# Patient Record
Sex: MALE | Race: WHITE | NOT HISPANIC OR LATINO | URBAN - METROPOLITAN AREA
[De-identification: names, ages, dates, MRNs, and addresses within clinical notes are randomized per-mention and may not be internally consistent; named-entity substitution may affect disease eponyms.]

---

## 2017-01-04 ENCOUNTER — OUTPATIENT (OUTPATIENT)
Dept: OUTPATIENT SERVICES | Facility: HOSPITAL | Age: 77
LOS: 1 days | End: 2017-01-04

## 2017-06-06 ENCOUNTER — OUTPATIENT (OUTPATIENT)
Dept: OUTPATIENT SERVICES | Facility: HOSPITAL | Age: 77
LOS: 1 days | Discharge: HOME | End: 2017-06-06

## 2019-07-05 PROBLEM — Z00.00 ENCOUNTER FOR PREVENTIVE HEALTH EXAMINATION: Status: ACTIVE | Noted: 2019-07-05

## 2019-07-10 ENCOUNTER — APPOINTMENT (OUTPATIENT)
Dept: MRI IMAGING | Facility: CLINIC | Age: 79
End: 2019-07-10
Payer: MEDICARE

## 2019-07-10 PROCEDURE — 72195 MRI PELVIS W/O DYE: CPT

## 2019-07-10 PROCEDURE — 72148 MRI LUMBAR SPINE W/O DYE: CPT

## 2019-08-05 ENCOUNTER — APPOINTMENT (OUTPATIENT)
Dept: CT IMAGING | Facility: CLINIC | Age: 79
End: 2019-08-05
Payer: MEDICARE

## 2019-08-05 PROCEDURE — 74176 CT ABD & PELVIS W/O CONTRAST: CPT

## 2019-09-04 ENCOUNTER — APPOINTMENT (OUTPATIENT)
Dept: NEUROSURGERY | Facility: CLINIC | Age: 79
End: 2019-09-04
Payer: MEDICARE

## 2019-09-04 VITALS
DIASTOLIC BLOOD PRESSURE: 78 MMHG | HEIGHT: 69 IN | WEIGHT: 220 LBS | BODY MASS INDEX: 32.58 KG/M2 | SYSTOLIC BLOOD PRESSURE: 148 MMHG

## 2019-09-04 PROCEDURE — 99204 OFFICE O/P NEW MOD 45 MIN: CPT

## 2019-09-04 RX ORDER — DICLOFENAC SODIUM 10 MG/G
1 GEL TOPICAL DAILY
Qty: 1 | Refills: 0 | Status: ACTIVE | COMMUNITY
Start: 2019-09-04 | End: 1900-01-01

## 2019-09-04 NOTE — REASON FOR VISIT
[New Patient Visit] : a new patient visit [FreeTextEntry1] : LOWER BACK PAIN, PREVIOUS VERTIFLEX PROCEDURE WITH DR JOSE, [facility]

## 2019-09-04 NOTE — PLAN
[FreeTextEntry1] : \par DIAGNOSIS:  DISK DEGENERATION  \par TREATMENT PLAN:  NON-SURGICAL\par \par This is a patient with a degenerated lumbar disk.  I have recommended nonsurgical management at this time.  This consists of physical therapy and/or manual medicine in conjunction to medical therapy and other conservative methods.  These include the consideration of trigger point injections and or the utilization of modalities such as TENS where applicable.  The next tier would be the referral to a pain management specialist (anesthesia or physiatry) for the consideration of spinal injections.  This includes the options of epidural steroids, facet injections as well as other novel techniques that may provide pain relief.  Although there is indeed evidence of disk degeneration on imaging, discectomy or spinal fusion for the sole purposes of treating back/leg pain in the absence of a compressive lesion or neurological issue is associated with poor outcomes.   \par \par I have reviewed the images in detail with the patient today in my office and have answered all questions regarding this condition to the best of my ability to the patient’s satisfaction.\par

## 2019-09-04 NOTE — HISTORY OF PRESENT ILLNESS
[de-identified] : \nany Watts is a pleasant 78-year-old here for evaluation of his lumbar spine. He has a primary complaint of axial back pain. He has minimal symptoms suggestive of neurogenic claudication. 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  He said many spinal procedures and has had a vertiflex placed at L45.\par \par PMD  Damaris\par PM  [facility]\par ortho [place]\par \par LBP   spreading to the RIGHT \par \par Old Hx   RIGHT leg weakness   was told femoral N issue\par \par \par

## 2019-09-04 NOTE — RESULTS/DATA
[FreeTextEntry1] : \par MRI and CAT scan of the lumbar spine were reviewed showing severe lumbar stenosis at multiple levels however L4-5 there is a paracentral disc bulge on the right far lateral likely compressing L4 and L5. There is a presence of a interspinous spacer. There is no evidence of instability.

## 2019-09-24 ENCOUNTER — TRANSCRIPTION ENCOUNTER (OUTPATIENT)
Age: 79
End: 2019-09-24

## 2020-02-18 ENCOUNTER — APPOINTMENT (OUTPATIENT)
Dept: ULTRASOUND IMAGING | Facility: CLINIC | Age: 80
End: 2020-02-18
Payer: MEDICARE

## 2020-02-18 PROCEDURE — 76775 US EXAM ABDO BACK WALL LIM: CPT

## 2021-01-26 ENCOUNTER — APPOINTMENT (OUTPATIENT)
Dept: ULTRASOUND IMAGING | Facility: CLINIC | Age: 81
End: 2021-01-26
Payer: MEDICARE

## 2021-01-26 PROCEDURE — 76775 US EXAM ABDO BACK WALL LIM: CPT

## 2021-03-23 ENCOUNTER — APPOINTMENT (OUTPATIENT)
Dept: RADIOLOGY | Facility: CLINIC | Age: 81
End: 2021-03-23
Payer: MEDICARE

## 2021-03-23 PROCEDURE — 71046 X-RAY EXAM CHEST 2 VIEWS: CPT

## 2021-12-22 ENCOUNTER — APPOINTMENT (OUTPATIENT)
Dept: NEUROSURGERY | Facility: CLINIC | Age: 81
End: 2021-12-22
Payer: MEDICARE

## 2021-12-22 ENCOUNTER — APPOINTMENT (OUTPATIENT)
Dept: NEUROSURGERY | Facility: CLINIC | Age: 81
End: 2021-12-22

## 2021-12-22 VITALS
HEIGHT: 69 IN | TEMPERATURE: 97.2 F | DIASTOLIC BLOOD PRESSURE: 60 MMHG | WEIGHT: 220 LBS | SYSTOLIC BLOOD PRESSURE: 150 MMHG | HEART RATE: 45 BPM | BODY MASS INDEX: 32.58 KG/M2

## 2021-12-22 DIAGNOSIS — Z86.39 PERSONAL HISTORY OF OTHER ENDOCRINE, NUTRITIONAL AND METABOLIC DISEASE: ICD-10-CM

## 2021-12-22 DIAGNOSIS — Z87.898 PERSONAL HISTORY OF OTHER SPECIFIED CONDITIONS: ICD-10-CM

## 2021-12-22 DIAGNOSIS — Z97.4 PRESENCE OF EXTERNAL HEARING-AID: ICD-10-CM

## 2021-12-22 DIAGNOSIS — Z87.448 PERSONAL HISTORY OF OTHER DISEASES OF URINARY SYSTEM: ICD-10-CM

## 2021-12-22 DIAGNOSIS — Z86.79 PERSONAL HISTORY OF OTHER DISEASES OF THE CIRCULATORY SYSTEM: ICD-10-CM

## 2021-12-22 PROCEDURE — 99213 OFFICE O/P EST LOW 20 MIN: CPT

## 2021-12-22 NOTE — REASON FOR VISIT
[Follow-Up: _____] : a [unfilled] follow-up visit [FreeTextEntry1] : 81-year-old male presents to the neurosurgery office for evaluation of low back pain.  The patient was last seen in 2019 and sat with the neurosurgeon to review the MRI imaging.  At that time the imaging revealed a severe lumbar stenosis.   Treatment options were discussed and the patient underwent conservative treatment with formal physical therapy, medications, and pain management (Dr. Rod).  A Vertiflex device was placed by the pain management doctor.  The patient ha since the patient has no sustained relief over the past few years s also undergone injections to the spine as well.,  The patient wanted to return to the office to see if there are any other treatment options available.  He states he ambulates with a hunched over gait at all times indicative of neurogenic claudication.

## 2021-12-22 NOTE — DATA REVIEWED
[de-identified] : Were reviewed of the lumbar spine -12/22/2021: No acute fracture/dislocation; flatback ; lumbar spondylosis;  L3-4 Vertiflex device in place

## 2021-12-22 NOTE — PHYSICAL EXAM
[General Appearance - Alert] : alert [General Appearance - In No Acute Distress] : in no acute distress [General Appearance - Well Nourished] : well nourished [General Appearance - Well Developed] : well developed [General Appearance - Well-Appearing] : healthy appearing [] : normal voice and communication [Oriented To Time, Place, And Person] : oriented to person, place, and time [Impaired Insight] : insight and judgment were intact [Affect] : the affect was normal [Mood] : the mood was normal [Memory Recent] : recent memory was not impaired [Memory Remote] : remote memory was not impaired [Person] : oriented to person [Place] : oriented to place [Time] : oriented to time [Motor Tone] : muscle tone was normal in all four extremities [Abnormal Walk] : normal gait [FreeTextEntry1] : The patient is hard of hearing and left his hearing aids at home [FreeTextEntry6] : Difficulty fully extending his right leg stating that he has a previous femoral nerve injury [FreeTextEntry8] : Patient ambulates with a hunched over gait; cane for assistance

## 2021-12-22 NOTE — ASSESSMENT
[FreeTextEntry1] : Discussed the history, physical examination findings, and recent imaging with the patient with all questions answered.  The patient has known history of lumbar stenosis.  An updated MRI of the lumbar spine has been ordered for evaluation.  The patient will have the imaging performed at [facility].  The patient will follow up after the imaging studies are complete to discuss further treatment options.

## 2022-01-04 ENCOUNTER — APPOINTMENT (OUTPATIENT)
Dept: MRI IMAGING | Facility: CLINIC | Age: 82
End: 2022-01-04
Payer: MEDICARE

## 2022-01-04 PROCEDURE — G1004: CPT

## 2022-01-04 PROCEDURE — 72148 MRI LUMBAR SPINE W/O DYE: CPT | Mod: ME

## 2022-01-06 ENCOUNTER — APPOINTMENT (OUTPATIENT)
Dept: NEUROSURGERY | Facility: CLINIC | Age: 82
End: 2022-01-06
Payer: MEDICARE

## 2022-01-06 VITALS
DIASTOLIC BLOOD PRESSURE: 80 MMHG | HEART RATE: 47 BPM | WEIGHT: 220 LBS | HEIGHT: 69 IN | SYSTOLIC BLOOD PRESSURE: 140 MMHG | BODY MASS INDEX: 32.58 KG/M2

## 2022-01-06 DIAGNOSIS — M54.9 DORSALGIA, UNSPECIFIED: ICD-10-CM

## 2022-01-06 DIAGNOSIS — M48.062 SPINAL STENOSIS, LUMBAR REGION WITH NEUROGENIC CLAUDICATION: ICD-10-CM

## 2022-01-06 DIAGNOSIS — M48.061 SPINAL STENOSIS, LUMBAR REGION WITHOUT NEUROGENIC CLAUDICATION: ICD-10-CM

## 2022-01-06 PROCEDURE — 99213 OFFICE O/P EST LOW 20 MIN: CPT

## 2022-01-06 RX ORDER — CELECOXIB 200 MG/1
200 CAPSULE ORAL DAILY
Qty: 30 | Refills: 1 | Status: ACTIVE | COMMUNITY
Start: 2022-01-06 | End: 1900-01-01

## 2022-01-06 NOTE — RESULTS/DATA
[FreeTextEntry1] : [facility] MRI of the lumbar spine was reviewed and is basically unchanged.  X-rays show an interspinous spacer at L3-4.  There is neuroforaminal narrowing on MRI at L3-4 and L4-5 and compared to the study done about a year or 2 ago at about the same.  Central canal is actually pretty patent that shows the neural foramina which are narrowed.

## 2022-01-06 NOTE — REASON FOR VISIT
[Follow-Up: _____] : a [unfilled] follow-up visit [FreeTextEntry1] : \par \par Mac is here for follow-up evaluation with a new MRI of the lumbar spine.  He has lumbar stenosis which is mostly neuroforaminal narrowing at L3-4 and L4-5.  He had an interbody spacer placed in the distant past which really did not provide him with any relief.  He has new imaging for my review.  His symptoms are mild to moderate.\par \par \par PCP  Damaris\par PM [facility]\par

## 2022-06-07 ENCOUNTER — APPOINTMENT (OUTPATIENT)
Dept: ULTRASOUND IMAGING | Facility: CLINIC | Age: 82
End: 2022-06-07
Payer: MEDICARE

## 2022-06-07 PROCEDURE — 76775 US EXAM ABDO BACK WALL LIM: CPT

## 2022-11-08 ENCOUNTER — APPOINTMENT (OUTPATIENT)
Dept: RADIOLOGY | Facility: CLINIC | Age: 82
End: 2022-11-08
Payer: MEDICARE

## 2022-11-08 PROCEDURE — 73560 X-RAY EXAM OF KNEE 1 OR 2: CPT | Mod: 50

## 2023-03-10 ENCOUNTER — APPOINTMENT (OUTPATIENT)
Dept: MRI IMAGING | Facility: CLINIC | Age: 83
End: 2023-03-10
Payer: MEDICARE

## 2023-03-10 PROCEDURE — 72146 MRI CHEST SPINE W/O DYE: CPT | Mod: MH

## 2023-09-14 ENCOUNTER — APPOINTMENT (OUTPATIENT)
Dept: ULTRASOUND IMAGING | Facility: CLINIC | Age: 83
End: 2023-09-14
Payer: MEDICARE

## 2023-09-14 PROCEDURE — 93925 LOWER EXTREMITY STUDY: CPT

## 2024-04-04 ENCOUNTER — APPOINTMENT (OUTPATIENT)
Dept: ULTRASOUND IMAGING | Facility: CLINIC | Age: 84
End: 2024-04-04
Payer: MEDICARE

## 2024-04-04 PROCEDURE — 76775 US EXAM ABDO BACK WALL LIM: CPT

## 2024-07-06 ENCOUNTER — RESULT REVIEW (OUTPATIENT)
Age: 84
End: 2024-07-06

## 2024-07-09 ENCOUNTER — TRANSCRIPTION ENCOUNTER (OUTPATIENT)
Age: 84
End: 2024-07-09

## 2025-01-27 ENCOUNTER — APPOINTMENT (OUTPATIENT)
Dept: ULTRASOUND IMAGING | Facility: CLINIC | Age: 85
End: 2025-01-27
Payer: MEDICARE

## 2025-01-27 PROCEDURE — 76775 US EXAM ABDO BACK WALL LIM: CPT

## 2025-08-11 ENCOUNTER — APPOINTMENT (OUTPATIENT)
Dept: ORTHOPEDIC SURGERY | Facility: CLINIC | Age: 85
End: 2025-08-11
Payer: MEDICARE

## 2025-08-11 DIAGNOSIS — Z87.891 PERSONAL HISTORY OF NICOTINE DEPENDENCE: ICD-10-CM

## 2025-08-11 DIAGNOSIS — M48.061 SPINAL STENOSIS, LUMBAR REGION WITHOUT NEUROGENIC CLAUDICATION: ICD-10-CM

## 2025-08-11 DIAGNOSIS — M40.05 POSTURAL KYPHOSIS, THORACOLUMBAR REGION: ICD-10-CM

## 2025-08-11 DIAGNOSIS — M47.816 SPONDYLOSIS W/OUT MYELOPATHY OR RADICULOPATHY, LUMBAR REGION: ICD-10-CM

## 2025-08-11 DIAGNOSIS — Z87.898 PERSONAL HISTORY OF OTHER SPECIFIED CONDITIONS: ICD-10-CM

## 2025-08-11 DIAGNOSIS — Z86.79 PERSONAL HISTORY OF OTHER DISEASES OF THE CIRCULATORY SYSTEM: ICD-10-CM

## 2025-08-11 PROCEDURE — 99204 OFFICE O/P NEW MOD 45 MIN: CPT

## 2025-08-11 PROCEDURE — 72100 X-RAY EXAM L-S SPINE 2/3 VWS: CPT

## 2025-08-11 RX ORDER — LEVOTHYROXINE SODIUM 125 UG/1
125 CAPSULE ORAL
Refills: 0 | Status: ACTIVE | COMMUNITY

## 2025-08-11 RX ORDER — ALFUZOSIN HYDROCHLORIDE 10 MG/1
TABLET, EXTENDED RELEASE ORAL
Refills: 0 | Status: ACTIVE | COMMUNITY

## 2025-08-11 RX ORDER — CLOPIDOGREL BISULFATE 75 MG/1
75 TABLET, FILM COATED ORAL
Refills: 0 | Status: ACTIVE | COMMUNITY

## 2025-08-11 RX ORDER — TORSEMIDE 20 MG/1
20 TABLET ORAL
Refills: 0 | Status: ACTIVE | COMMUNITY

## 2025-08-11 RX ORDER — ISOSORBIDE MONONITRATE 30 MG/1
30 TABLET, EXTENDED RELEASE ORAL
Refills: 0 | Status: ACTIVE | COMMUNITY

## 2025-08-11 RX ORDER — HYDRALAZINE HYDROCHLORIDE 25 MG/1
25 TABLET ORAL
Refills: 0 | Status: ACTIVE | COMMUNITY

## 2025-08-11 RX ORDER — FAMOTIDINE 20 MG/1
20 TABLET, FILM COATED ORAL
Refills: 0 | Status: ACTIVE | COMMUNITY

## 2025-08-11 RX ORDER — TAMSULOSIN HYDROCHLORIDE 0.4 MG/1
0.4 CAPSULE ORAL
Refills: 0 | Status: ACTIVE | COMMUNITY

## 2025-08-11 RX ORDER — APIXABAN 2.5 MG/1
2.5 TABLET, FILM COATED ORAL
Refills: 0 | Status: ACTIVE | COMMUNITY

## 2025-08-11 RX ORDER — RANOLAZINE 500 MG/1
500 TABLET, FILM COATED, EXTENDED RELEASE ORAL
Refills: 0 | Status: ACTIVE | COMMUNITY

## 2025-08-11 RX ORDER — AMIODARONE HYDROCHLORIDE 200 MG/1
200 TABLET ORAL
Refills: 0 | Status: ACTIVE | COMMUNITY

## 2025-08-11 RX ORDER — FINASTERIDE 5 MG/1
5 TABLET, FILM COATED ORAL
Refills: 0 | Status: ACTIVE | COMMUNITY

## 2025-08-11 RX ORDER — CARVEDILOL 6.25 MG/1
6.25 TABLET, FILM COATED ORAL
Refills: 0 | Status: ACTIVE | COMMUNITY

## 2025-08-11 RX ORDER — EZETIMIBE 10 MG/1
10 TABLET ORAL
Refills: 0 | Status: ACTIVE | COMMUNITY

## 2025-08-11 RX ORDER — PARICALCITOL 2 UG/1
2 CAPSULE ORAL
Refills: 0 | Status: ACTIVE | COMMUNITY

## 2025-08-13 ENCOUNTER — APPOINTMENT (OUTPATIENT)
Dept: ULTRASOUND IMAGING | Facility: CLINIC | Age: 85
End: 2025-08-13
Payer: MEDICARE

## 2025-08-13 PROCEDURE — 76770 US EXAM ABDO BACK WALL COMP: CPT
